# Patient Record
Sex: FEMALE | Race: OTHER
[De-identification: names, ages, dates, MRNs, and addresses within clinical notes are randomized per-mention and may not be internally consistent; named-entity substitution may affect disease eponyms.]

---

## 2017-05-17 ENCOUNTER — HOSPITAL ENCOUNTER (INPATIENT)
Dept: HOSPITAL 54 - TELE-TD | Age: 77
LOS: 2 days | Discharge: ANOTHER HOSPITAL | DRG: 205 | End: 2017-05-19
Attending: INTERNAL MEDICINE | Admitting: INTERNAL MEDICINE
Payer: MEDICARE

## 2017-05-17 VITALS — DIASTOLIC BLOOD PRESSURE: 106 MMHG | SYSTOLIC BLOOD PRESSURE: 145 MMHG

## 2017-05-17 VITALS — BODY MASS INDEX: 20.84 KG/M2 | WEIGHT: 125.06 LBS | HEIGHT: 65 IN

## 2017-05-17 DIAGNOSIS — I11.0: ICD-10-CM

## 2017-05-17 DIAGNOSIS — E11.9: ICD-10-CM

## 2017-05-17 DIAGNOSIS — I25.10: ICD-10-CM

## 2017-05-17 DIAGNOSIS — I25.2: ICD-10-CM

## 2017-05-17 DIAGNOSIS — I34.0: ICD-10-CM

## 2017-05-17 DIAGNOSIS — E87.6: ICD-10-CM

## 2017-05-17 DIAGNOSIS — E78.5: ICD-10-CM

## 2017-05-17 DIAGNOSIS — M94.0: Primary | ICD-10-CM

## 2017-05-17 DIAGNOSIS — Z98.61: ICD-10-CM

## 2017-05-17 DIAGNOSIS — I25.5: ICD-10-CM

## 2017-05-17 DIAGNOSIS — I50.23: ICD-10-CM

## 2017-05-17 RX ADMIN — ASPIRIN 81 MG SCH MG: 81 TABLET ORAL at 23:02

## 2017-05-17 RX ADMIN — ATORVASTATIN CALCIUM SCH MG: 40 TABLET, FILM COATED ORAL at 23:03

## 2017-05-17 NOTE — NUR
received pt from [facility] direct admit,  a/o x4, SB(48-50) MD aware, s/p stent placement on 
5/5, last troponin negative,  EF 50%, no chest pain verbalized, on 2l 02, lungs clear, no 
edema, v/s stable, no pain, pt turns and repositions by herself, daughter at the  bedside.

## 2017-05-18 VITALS — DIASTOLIC BLOOD PRESSURE: 48 MMHG | SYSTOLIC BLOOD PRESSURE: 98 MMHG

## 2017-05-18 VITALS — DIASTOLIC BLOOD PRESSURE: 61 MMHG | SYSTOLIC BLOOD PRESSURE: 95 MMHG

## 2017-05-18 VITALS — DIASTOLIC BLOOD PRESSURE: 48 MMHG | SYSTOLIC BLOOD PRESSURE: 97 MMHG

## 2017-05-18 VITALS — DIASTOLIC BLOOD PRESSURE: 60 MMHG | SYSTOLIC BLOOD PRESSURE: 126 MMHG

## 2017-05-18 VITALS — DIASTOLIC BLOOD PRESSURE: 39 MMHG | SYSTOLIC BLOOD PRESSURE: 119 MMHG

## 2017-05-18 VITALS — SYSTOLIC BLOOD PRESSURE: 125 MMHG | DIASTOLIC BLOOD PRESSURE: 51 MMHG

## 2017-05-18 LAB
BASOPHILS # BLD AUTO: 0 /CMM (ref 0–0.2)
BASOPHILS NFR BLD AUTO: 0.5 % (ref 0–2)
BUN SERPL-MCNC: 9 MG/DL (ref 7–18)
CALCIUM SERPL-MCNC: 9.1 MG/DL (ref 8.5–10.1)
CHLORIDE SERPL-SCNC: 106 MMOL/L (ref 98–107)
CO2 SERPL-SCNC: 26 MMOL/L (ref 21–32)
CREAT SERPL-MCNC: 0.7 MG/DL (ref 0.6–1.3)
EOSINOPHIL # BLD AUTO: 0.1 /CMM (ref 0–0.7)
EOSINOPHIL NFR BLD AUTO: 2 % (ref 0–6)
GLUCOSE SERPL-MCNC: 91 MG/DL (ref 74–106)
HCT VFR BLD AUTO: 39 % (ref 33–45)
HGB BLD-MCNC: 13.1 G/DL (ref 11.5–14.8)
LYMPHOCYTES NFR BLD AUTO: 1.5 /CMM (ref 0.8–4.8)
LYMPHOCYTES NFR BLD AUTO: 22.6 % (ref 20–44)
MCH RBC QN AUTO: 30 PG (ref 26–33)
MCHC RBC AUTO-ENTMCNC: 34 G/DL (ref 31–36)
MCV RBC AUTO: 88 FL (ref 82–100)
MONOCYTES NFR BLD AUTO: 0.6 /CMM (ref 0.1–1.3)
MONOCYTES NFR BLD AUTO: 9 % (ref 2–12)
NEUTROPHILS # BLD AUTO: 4.5 /CMM (ref 1.8–8.9)
NEUTROPHILS NFR BLD AUTO: 65.9 % (ref 43–81)
NT-PROBNP SERPL-MCNC: 2178 PG/ML (ref 0–125)
PLATELET # BLD AUTO: 211 /CMM (ref 150–450)
POTASSIUM SERPL-SCNC: 3.4 MMOL/L (ref 3.5–5.1)
RBC # BLD AUTO: 4.46 MIL/UL (ref 4–5.2)
RDW COEFFICIENT OF VARIATION: 12.9 (ref 11.5–15)
SODIUM SERPL-SCNC: 142 MMOL/L (ref 136–145)
TROPONIN I SERPL-MCNC: < 0.017 NG/ML (ref 0–0.06)
WBC NRBC COR # BLD AUTO: 6.9 K/UL (ref 4.3–11)

## 2017-05-18 RX ADMIN — Medication SCH EA: at 09:43

## 2017-05-18 RX ADMIN — LISINOPRIL SCH MG: 10 TABLET ORAL at 08:56

## 2017-05-18 RX ADMIN — CARVEDILOL SCH MG: 6.25 TABLET, FILM COATED ORAL at 20:53

## 2017-05-18 RX ADMIN — CARVEDILOL SCH MG: 6.25 TABLET, FILM COATED ORAL at 09:44

## 2017-05-18 RX ADMIN — Medication SCH EA: at 18:14

## 2017-05-18 RX ADMIN — LEVOTHYROXINE SODIUM SCH MCG: 75 TABLET ORAL at 07:47

## 2017-05-18 RX ADMIN — Medication SCH MG: at 09:43

## 2017-05-18 RX ADMIN — ASPIRIN 81 MG SCH MG: 81 TABLET ORAL at 08:53

## 2017-05-18 RX ADMIN — ATORVASTATIN CALCIUM SCH MG: 40 TABLET, FILM COATED ORAL at 20:54

## 2017-05-18 NOTE — NUR
ICU/RN: INITIAL NOTES,AM



RECEIVED REPORT FROM NIGHT NURSE. PT ALERT, AWAKE, ORIENTED X4. ON NASAL CANULA, NO ACUTE 
DISTRESS NOTED AT THIS TIME. PT DIRECT ADMIT FROM [place], NO CHEST PAIN NOTED AT THIS TIME. 
PT SINUS DURGA ON TELE. DAUGHTER AT BEDSIDE. PT ABLE TO AMBULATE, BRP. ALL NEEDS WILL BE 
MET, SAFETY MEASURES TAKEN, BED IN LOW POSITION, SIDE RAILS UP, CALL LIGHT WITHIN REACH. 
WILL CONTINUE CARE.

## 2017-05-18 NOTE — NUR
ICU/RN:  AT BEDSIDE. PT ASSESSED, MEDICATIONS REVIEWED. DOCTOR ANSWERED ALL 
QUESTIONS FOR PT AND DAUGHTER AND INTERVENTIONS DISCUSSED. WILL CONTINUE TO EDUCATE FAMILY 
AND ADDRESS ALL NEEDS

## 2017-05-18 NOTE — NUR
MARCIA/RN ENDING NOTES,AM



REPORT WILL BE ENDORSED TO NIGHT NURSE FOR CONTINUATION OF CARE. PT ON NASAL CANULA, NO 
ACUTE DISTRESS NOTED. PT HAS NO CHEST PAIN AT THIS TIME. FAMILY AT BEDSIDE. ALL NEEDS MET, 
SAFETY MEASURES TAKEN, BED IN LOW POSITION, SIDE RAILS UP, CALL LIGHT WITHIN REACH.

## 2017-05-19 ENCOUNTER — HOSPITAL ENCOUNTER (INPATIENT)
Dept: HOSPITAL 10 - CCL | Age: 77
LOS: 1 days | Discharge: HOME | DRG: 247 | End: 2017-05-20
Attending: INTERNAL MEDICINE | Admitting: INTERNAL MEDICINE
Payer: MEDICARE

## 2017-05-19 VITALS — RESPIRATION RATE: 15 BRPM | HEART RATE: 80 BPM | SYSTOLIC BLOOD PRESSURE: 106 MMHG | DIASTOLIC BLOOD PRESSURE: 53 MMHG

## 2017-05-19 VITALS — HEART RATE: 72 BPM | DIASTOLIC BLOOD PRESSURE: 50 MMHG | SYSTOLIC BLOOD PRESSURE: 105 MMHG | RESPIRATION RATE: 19 BRPM

## 2017-05-19 VITALS — DIASTOLIC BLOOD PRESSURE: 61 MMHG | HEART RATE: 73 BPM | RESPIRATION RATE: 14 BRPM | SYSTOLIC BLOOD PRESSURE: 140 MMHG

## 2017-05-19 VITALS — RESPIRATION RATE: 14 BRPM | DIASTOLIC BLOOD PRESSURE: 66 MMHG | HEART RATE: 62 BPM | SYSTOLIC BLOOD PRESSURE: 116 MMHG

## 2017-05-19 VITALS — SYSTOLIC BLOOD PRESSURE: 135 MMHG | DIASTOLIC BLOOD PRESSURE: 61 MMHG | HEART RATE: 66 BPM | RESPIRATION RATE: 18 BRPM

## 2017-05-19 VITALS — HEART RATE: 82 BPM | RESPIRATION RATE: 14 BRPM | SYSTOLIC BLOOD PRESSURE: 91 MMHG | DIASTOLIC BLOOD PRESSURE: 59 MMHG

## 2017-05-19 VITALS — DIASTOLIC BLOOD PRESSURE: 55 MMHG | SYSTOLIC BLOOD PRESSURE: 104 MMHG

## 2017-05-19 VITALS — RESPIRATION RATE: 18 BRPM | HEART RATE: 70 BPM | DIASTOLIC BLOOD PRESSURE: 49 MMHG | SYSTOLIC BLOOD PRESSURE: 93 MMHG

## 2017-05-19 VITALS — SYSTOLIC BLOOD PRESSURE: 86 MMHG | HEART RATE: 75 BPM | DIASTOLIC BLOOD PRESSURE: 45 MMHG | RESPIRATION RATE: 20 BRPM

## 2017-05-19 VITALS — RESPIRATION RATE: 20 BRPM | DIASTOLIC BLOOD PRESSURE: 58 MMHG | SYSTOLIC BLOOD PRESSURE: 116 MMHG | HEART RATE: 67 BPM

## 2017-05-19 VITALS — SYSTOLIC BLOOD PRESSURE: 125 MMHG | RESPIRATION RATE: 20 BRPM | DIASTOLIC BLOOD PRESSURE: 56 MMHG | HEART RATE: 65 BPM

## 2017-05-19 VITALS — RESPIRATION RATE: 15 BRPM | DIASTOLIC BLOOD PRESSURE: 63 MMHG | SYSTOLIC BLOOD PRESSURE: 117 MMHG | HEART RATE: 64 BPM

## 2017-05-19 VITALS — RESPIRATION RATE: 22 BRPM | SYSTOLIC BLOOD PRESSURE: 116 MMHG | DIASTOLIC BLOOD PRESSURE: 60 MMHG | HEART RATE: 63 BPM

## 2017-05-19 VITALS — DIASTOLIC BLOOD PRESSURE: 56 MMHG | RESPIRATION RATE: 15 BRPM | SYSTOLIC BLOOD PRESSURE: 126 MMHG | HEART RATE: 69 BPM

## 2017-05-19 VITALS — RESPIRATION RATE: 16 BRPM | HEART RATE: 79 BPM | DIASTOLIC BLOOD PRESSURE: 71 MMHG | SYSTOLIC BLOOD PRESSURE: 95 MMHG

## 2017-05-19 VITALS — SYSTOLIC BLOOD PRESSURE: 99 MMHG | HEART RATE: 73 BPM | RESPIRATION RATE: 21 BRPM | DIASTOLIC BLOOD PRESSURE: 50 MMHG

## 2017-05-19 VITALS — SYSTOLIC BLOOD PRESSURE: 90 MMHG | DIASTOLIC BLOOD PRESSURE: 44 MMHG

## 2017-05-19 VITALS — DIASTOLIC BLOOD PRESSURE: 56 MMHG | RESPIRATION RATE: 19 BRPM | HEART RATE: 66 BPM | SYSTOLIC BLOOD PRESSURE: 123 MMHG

## 2017-05-19 VITALS — SYSTOLIC BLOOD PRESSURE: 105 MMHG | RESPIRATION RATE: 18 BRPM | DIASTOLIC BLOOD PRESSURE: 52 MMHG | HEART RATE: 70 BPM

## 2017-05-19 VITALS — SYSTOLIC BLOOD PRESSURE: 104 MMHG | DIASTOLIC BLOOD PRESSURE: 55 MMHG

## 2017-05-19 VITALS — HEART RATE: 76 BPM | DIASTOLIC BLOOD PRESSURE: 58 MMHG | RESPIRATION RATE: 17 BRPM | SYSTOLIC BLOOD PRESSURE: 92 MMHG

## 2017-05-19 VITALS — HEART RATE: 73 BPM | SYSTOLIC BLOOD PRESSURE: 100 MMHG | DIASTOLIC BLOOD PRESSURE: 38 MMHG | RESPIRATION RATE: 25 BRPM

## 2017-05-19 VITALS
HEIGHT: 66 IN | BODY MASS INDEX: 20.9 KG/M2 | WEIGHT: 130.07 LBS | BODY MASS INDEX: 20.9 KG/M2 | HEIGHT: 66 IN | WEIGHT: 130.07 LBS

## 2017-05-19 VITALS — SYSTOLIC BLOOD PRESSURE: 143 MMHG | RESPIRATION RATE: 19 BRPM | DIASTOLIC BLOOD PRESSURE: 68 MMHG | HEART RATE: 56 BPM

## 2017-05-19 VITALS — SYSTOLIC BLOOD PRESSURE: 95 MMHG | DIASTOLIC BLOOD PRESSURE: 47 MMHG

## 2017-05-19 VITALS — HEART RATE: 75 BPM | SYSTOLIC BLOOD PRESSURE: 92 MMHG | DIASTOLIC BLOOD PRESSURE: 49 MMHG | RESPIRATION RATE: 17 BRPM

## 2017-05-19 DIAGNOSIS — I42.9: ICD-10-CM

## 2017-05-19 DIAGNOSIS — Z79.4: ICD-10-CM

## 2017-05-19 DIAGNOSIS — E03.9: ICD-10-CM

## 2017-05-19 DIAGNOSIS — Z79.02: ICD-10-CM

## 2017-05-19 DIAGNOSIS — I10: ICD-10-CM

## 2017-05-19 DIAGNOSIS — I25.110: ICD-10-CM

## 2017-05-19 DIAGNOSIS — I21.4: Primary | ICD-10-CM

## 2017-05-19 DIAGNOSIS — E78.5: ICD-10-CM

## 2017-05-19 DIAGNOSIS — Z79.82: ICD-10-CM

## 2017-05-19 DIAGNOSIS — E11.9: ICD-10-CM

## 2017-05-19 LAB
APTT PPP: 32 SEC (ref 23–34)
BASOPHILS # BLD AUTO: 0 /CMM (ref 0–0.2)
BASOPHILS NFR BLD AUTO: 0.3 % (ref 0–2)
BUN SERPL-MCNC: 13 MG/DL (ref 7–18)
CALCIUM SERPL-MCNC: 8.8 MG/DL (ref 8.5–10.1)
CHLORIDE SERPL-SCNC: 105 MMOL/L (ref 98–107)
CO2 SERPL-SCNC: 25 MMOL/L (ref 21–32)
CREAT SERPL-MCNC: 0.8 MG/DL (ref 0.6–1.3)
EOSINOPHIL # BLD AUTO: 0.1 /CMM (ref 0–0.7)
EOSINOPHIL NFR BLD AUTO: 1.5 % (ref 0–6)
GLUCOSE SERPL-MCNC: 111 MG/DL (ref 74–106)
HCT VFR BLD AUTO: 37 % (ref 33–45)
HGB BLD-MCNC: 12.5 G/DL (ref 11.5–14.8)
INR PPP: 1.06 (ref 0.87–1.13)
LYMPHOCYTES NFR BLD AUTO: 1.2 /CMM (ref 0.8–4.8)
LYMPHOCYTES NFR BLD AUTO: 13 % (ref 20–44)
MAGNESIUM SERPL-MCNC: 2 MG/DL (ref 1.8–2.4)
MCH RBC QN AUTO: 30 PG (ref 26–33)
MCHC RBC AUTO-ENTMCNC: 34 G/DL (ref 31–36)
MCV RBC AUTO: 88 FL (ref 82–100)
MONOCYTES NFR BLD AUTO: 0.7 /CMM (ref 0.1–1.3)
MONOCYTES NFR BLD AUTO: 7.6 % (ref 2–12)
NEUTROPHILS # BLD AUTO: 7.4 /CMM (ref 1.8–8.9)
NEUTROPHILS NFR BLD AUTO: 77.6 % (ref 43–81)
PHOSPHATE SERPL-MCNC: 4.2 MG/DL (ref 2.5–4.9)
PLATELET # BLD AUTO: 234 /CMM (ref 150–450)
POTASSIUM SERPL-SCNC: 3.9 MMOL/L (ref 3.5–5.1)
PROTHROMBIN TIME: 11.4 SECS (ref 9.5–12.7)
RBC # BLD AUTO: 4.17 MIL/UL (ref 4–5.2)
RDW COEFFICIENT OF VARIATION: 13.1 (ref 11.5–15)
SODIUM SERPL-SCNC: 139 MMOL/L (ref 136–145)
WBC NRBC COR # BLD AUTO: 9.5 K/UL (ref 4.3–11)

## 2017-05-19 PROCEDURE — C9600 PERC DRUG-EL COR STENT SING: HCPCS

## 2017-05-19 PROCEDURE — 027034Z DILATION OF CORONARY ARTERY, ONE ARTERY WITH DRUG-ELUTING INTRALUMINAL DEVICE, PERCUTANEOUS APPROACH: ICD-10-PCS | Performed by: INTERNAL MEDICINE

## 2017-05-19 PROCEDURE — C1887 CATHETER, GUIDING: HCPCS

## 2017-05-19 PROCEDURE — 4A023N7 MEASUREMENT OF CARDIAC SAMPLING AND PRESSURE, LEFT HEART, PERCUTANEOUS APPROACH: ICD-10-PCS | Performed by: INTERNAL MEDICINE

## 2017-05-19 PROCEDURE — C1894 INTRO/SHEATH, NON-LASER: HCPCS

## 2017-05-19 PROCEDURE — 80048 BASIC METABOLIC PNL TOTAL CA: CPT

## 2017-05-19 PROCEDURE — 87081 CULTURE SCREEN ONLY: CPT

## 2017-05-19 PROCEDURE — C1874 STENT, COATED/COV W/DEL SYS: HCPCS

## 2017-05-19 PROCEDURE — 85025 COMPLETE CBC W/AUTO DIFF WBC: CPT

## 2017-05-19 PROCEDURE — 83735 ASSAY OF MAGNESIUM: CPT

## 2017-05-19 PROCEDURE — C1725 CATH, TRANSLUMIN NON-LASER: HCPCS

## 2017-05-19 PROCEDURE — C1769 GUIDE WIRE: HCPCS

## 2017-05-19 PROCEDURE — 93458 L HRT ARTERY/VENTRICLE ANGIO: CPT

## 2017-05-19 PROCEDURE — 84100 ASSAY OF PHOSPHORUS: CPT

## 2017-05-19 PROCEDURE — B211YZZ FLUOROSCOPY OF MULTIPLE CORONARY ARTERIES USING OTHER CONTRAST: ICD-10-PCS | Performed by: INTERNAL MEDICINE

## 2017-05-19 RX ADMIN — Medication SCH EA: at 08:08

## 2017-05-19 RX ADMIN — LEVOTHYROXINE SODIUM SCH MCG: 75 TABLET ORAL at 08:08

## 2017-05-19 RX ADMIN — CARVEDILOL SCH MG: 6.25 TABLET, FILM COATED ORAL at 08:10

## 2017-05-19 RX ADMIN — Medication SCH MG: at 08:11

## 2017-05-19 RX ADMIN — ASPIRIN 81 MG SCH MG: 81 TABLET ORAL at 15:39

## 2017-05-19 RX ADMIN — ASPIRIN 81 MG SCH MG: 81 TABLET ORAL at 08:08

## 2017-05-19 RX ADMIN — LISINOPRIL SCH MG: 5 TABLET ORAL at 15:39

## 2017-05-19 RX ADMIN — LISINOPRIL SCH MG: 10 TABLET ORAL at 08:12

## 2017-05-19 RX ADMIN — ISOSORBIDE MONONITRATE SCH MG: 30 TABLET, EXTENDED RELEASE ORAL at 15:39

## 2017-05-19 NOTE — OPR
Date/Time of Note


Date/Time of Note


DATE: 5/19/17 


TIME: 13:25





Operative Report


Free Text/Dictation


Procedure Date: 5/19/2017





Procedures Performed:


1)Left heart catheterization with selective left and right coronary angiography.


2)Balloon angioplasty and stenting of the ostial RCA with a Synergy 2.25 x 20 

drug-eluting stent.





Pre-operative Diagnosis: Chest pain with ACS, known residual ostial RCA lesion 

noted during PCI for recent anterior MI





Post-operative Diagnosis:same. s/p ostial RCA PCI





Indications:77 yo F with a h/o CAD with recent anterior STEMI s/p PCI with 

stents to prox and distal LAD and known residual ostial RCA disease who 

presented with recurrent chest pain.  Initial trop at [facility] was 0.9, 

repeat at [place] was normal.  As pt had recurrent symptoms and known 

residual stenosis which was to be revascularized, the plan was for angiography 

to confirm and possible PCI.








Description of Procedure:


After informed consent, the patient was brought to the cardiac catheterization 

lab.  The procedure site was prepped and draped in usual manner. The patient 

was premedicated with versed 0.5 mg . 5 mL lidocaine was injected into the 

right groin.  Next using a micropuncture needle and the Seldinger technique, 

the 6 Guamanian sheath was inserted into the right femoral artery. Next using the 

JL4 and JR4, selective angiography of the left and right coronary arteries were 

obtained. LV pressures were obtained with the JR.  Left ventricle angiography 

was not obtained. 





The decision was made to proceed with PCI of the ostial RCA as this was 

angiographically stenosed and the pt had recurrent angina. The remainder of the 

vessel had diffuse plaquing and the decision was made to only stent the ostial 

RCA.  A 6 Guamanian JR 3.5 with side holes guide was advanced and engaged into the 

right coronary artery. After appropriate anticoagulation and antiplatelets were 

given, the BMW angioplasty wire was advanced past the lesion. Next the 2.0 X 12 

balloon was used to dilate the lesion times 3 at a maximum of 12 reza.  

Subsequently, the Synergy 2.25 x 20 stent was advanced to the lesion and 

deployed at 11 reza. Next the stent was post dilated with the 2.5 X 12 

noncompliant balloon times 3 at a maximum of 12 reza including fairing of the 

ostium. Final angiography revealed AVA 3 flow, no edge dissection, and 

appropriate stent expansion. 








Next all equipment was removed and hemostasis was achieved by manual 

compression which will be done in the ICU.





Findings: 





Anatomy/Hemodynamics:


Left main: normal


LAD:prox and distal stents patent


Diagonal:small diffusely disease vessel


Circumflex:no significant disease


Obtuse marginal:no significant disease


RCA: dominant but small vessel with ostial 70-80%, and diffuse plaquing 

including up to 50% in the mid and distal vessel


PDA: mild plaquing 


PLV:mild plaquing 





LV angiography:not done





LV-Ao pullback: no gradient


LVEDP: 5 mmHg








Contrast used: 190mL





Medications used:


Versed 0.5


Ticagrelor 90mg (pt received 90mg earlier in day and has been on it at home)


ASA already given prior


angiomax bolus plus drip





Equipment used:


6 Guamanian JR 3.5 with side holes guide


BMW angioplasty wire


2 x 12 balloon 


Synergy 2.25 x 20 drug eluting stent


2.5 x 12 noncompliant balloon





Assessment:


ACS/chest pain s/p PCI of ostial RCA 


h/o anterior STEMI (patent stents)


Ischemic cardiomyopathy: EF 40%





Plan:


-ASA 81mg


-ticagrelor 90mg BID


-lipitor


-coreg and lisinopril as BP and HR tolerate


-imdur 30mg for small vessel disease 


-monitor o/n. Possible d/c tomorrow











JOSE PICHARDO May 19, 2017 13:38

## 2017-05-19 NOTE — NUR
RN INITIAL NOTE

RECEIVED PT FROM DARYN ICU RN. PT A/OX 4 Kiswahili SPEAKING DAUGHTER @ BEDSIDE. PT NC 2L NO 
ACUTE SOB. TELE SB WITH INVERTED T-WAVE. IV RAC #20G . AWAITING TRANSFER TO [facility] TO CATH LAB. 
REPORT GIVEN TO THOM STEARNS.

## 2017-05-19 NOTE — NUR
RN NOTE

PT TRANSFERED TO [facility] FOR CATH. PT STABLE TAKE VIA AMBULANCE REPORT GIVEN TO CHELSIE RUVALCABA. 

B/P TAKEN 128/69. IV FLUSHED PATENT AND INTACT. BELONGING LIST SIGNED BY DAUGHTER WHO IS @ 
BEDSIDE. BELONGINGS LIST AND MEDICATION  (BRILINTA) RECEIVED FROM PHARMACY AND GIVEN TO 
DAUGHTER. DC TRANSFER PAPER WORK AND LAB RESULTS GIVEN TO EMT.

## 2017-05-19 NOTE — NUR
ICU RN - PT. IS RESTING W/EYES CLOSED. AT START OF SHIFT, FAMILY IN RM. TO TALK W/DR. LAND. MD WAS POLITE ENOUGH TO CALL BACK & TALK W/ FAMILY MEMBERS VIA PHONE. MD TALKED 
W/ME RE: CARDIAC CATH TO BE DONE AT [facility] TOMORROW AT NOON.  DARYN, TALKED W/CHARGE 
JUNE KHAN. CHART COPIED. PT. REC'D ONE ULTRAM 50MG/PO AT START OF SHIFT LAST NIGHT. PT. 
CLAIMS THAT HER BLE'S "SPASM". PT.HAD LEFT ENDARTECTOMY DONE W/STENTS AT [facility] - 10 DAYS AGO. 
HEART MONITOR SHOWED SB IN THE 50'S AT [place]. PT. WAS SOUND ASLEEP.

AFEBRILE. PT.IS NPO NOW. ALL PULSES PALBABLE X 4 EXT. PT. IS ON O2/2L/NC. WILL CALL REPORT 
TO [facility] AT 6AM. CONT. POC.

## 2017-05-20 VITALS — SYSTOLIC BLOOD PRESSURE: 96 MMHG | HEART RATE: 66 BPM | RESPIRATION RATE: 16 BRPM | DIASTOLIC BLOOD PRESSURE: 53 MMHG

## 2017-05-20 VITALS — DIASTOLIC BLOOD PRESSURE: 57 MMHG | SYSTOLIC BLOOD PRESSURE: 122 MMHG | RESPIRATION RATE: 17 BRPM | HEART RATE: 60 BPM

## 2017-05-20 VITALS — DIASTOLIC BLOOD PRESSURE: 71 MMHG | HEART RATE: 73 BPM | RESPIRATION RATE: 19 BRPM | SYSTOLIC BLOOD PRESSURE: 108 MMHG

## 2017-05-20 VITALS — SYSTOLIC BLOOD PRESSURE: 94 MMHG | HEART RATE: 78 BPM | DIASTOLIC BLOOD PRESSURE: 55 MMHG | RESPIRATION RATE: 18 BRPM

## 2017-05-20 VITALS — SYSTOLIC BLOOD PRESSURE: 101 MMHG | DIASTOLIC BLOOD PRESSURE: 50 MMHG | HEART RATE: 64 BPM | RESPIRATION RATE: 16 BRPM

## 2017-05-20 VITALS — RESPIRATION RATE: 18 BRPM | SYSTOLIC BLOOD PRESSURE: 94 MMHG | HEART RATE: 64 BPM | DIASTOLIC BLOOD PRESSURE: 53 MMHG

## 2017-05-20 VITALS — SYSTOLIC BLOOD PRESSURE: 95 MMHG | DIASTOLIC BLOOD PRESSURE: 53 MMHG | HEART RATE: 66 BPM | RESPIRATION RATE: 17 BRPM

## 2017-05-20 VITALS — HEART RATE: 71 BPM

## 2017-05-20 VITALS — SYSTOLIC BLOOD PRESSURE: 109 MMHG | HEART RATE: 78 BPM | DIASTOLIC BLOOD PRESSURE: 65 MMHG | RESPIRATION RATE: 17 BRPM

## 2017-05-20 VITALS — DIASTOLIC BLOOD PRESSURE: 51 MMHG | SYSTOLIC BLOOD PRESSURE: 90 MMHG | HEART RATE: 63 BPM | RESPIRATION RATE: 18 BRPM

## 2017-05-20 VITALS — RESPIRATION RATE: 15 BRPM | SYSTOLIC BLOOD PRESSURE: 92 MMHG | HEART RATE: 76 BPM | DIASTOLIC BLOOD PRESSURE: 54 MMHG

## 2017-05-20 VITALS — SYSTOLIC BLOOD PRESSURE: 114 MMHG | DIASTOLIC BLOOD PRESSURE: 73 MMHG | RESPIRATION RATE: 18 BRPM | HEART RATE: 71 BPM

## 2017-05-20 VITALS — RESPIRATION RATE: 20 BRPM | SYSTOLIC BLOOD PRESSURE: 92 MMHG | DIASTOLIC BLOOD PRESSURE: 49 MMHG | HEART RATE: 65 BPM

## 2017-05-20 VITALS — RESPIRATION RATE: 19 BRPM | HEART RATE: 74 BPM | DIASTOLIC BLOOD PRESSURE: 54 MMHG | SYSTOLIC BLOOD PRESSURE: 111 MMHG

## 2017-05-20 VITALS — HEART RATE: 67 BPM

## 2017-05-20 LAB
ADD SCAN DIFF: NO
ANION GAP SERPL CALC-SCNC: 15 MMOL/L (ref 8–16)
BASOPHILS # BLD AUTO: 0 10^3/UL (ref 0–0.1)
BASOPHILS NFR BLD: 0.4 % (ref 0–2)
BUN SERPL-MCNC: 10 MG/DL (ref 7–20)
CALCIUM SERPL-MCNC: 9.2 MG/DL (ref 8.4–10.2)
CHLORIDE SERPL-SCNC: 101 MMOL/L (ref 97–110)
CO2 SERPL-SCNC: 27 MMOL/L (ref 21–31)
CREAT SERPL-MCNC: 0.69 MG/DL (ref 0.44–1)
EOSINOPHIL # BLD: 0.1 10^3/UL (ref 0–0.5)
EOSINOPHIL NFR BLD: 1.1 % (ref 0–7)
ERYTHROCYTE [DISTWIDTH] IN BLOOD BY AUTOMATED COUNT: 13 % (ref 11.5–14.5)
GLUCOSE SERPL-MCNC: 110 MG/DL (ref 70–220)
HCT VFR BLD CALC: 36.4 % (ref 37–47)
HGB BLD-MCNC: 12 G/DL (ref 12–16)
LYMPHOCYTES # BLD AUTO: 1.2 10^3/UL (ref 0.8–2.9)
LYMPHOCYTES NFR BLD AUTO: 14.7 % (ref 15–51)
MAGNESIUM SERPL-MCNC: 1.9 MG/DL (ref 1.7–2.5)
MCH RBC QN AUTO: 29.9 PG (ref 29–33)
MCHC RBC AUTO-ENTMCNC: 33 G/DL (ref 32–37)
MCV RBC AUTO: 90.8 FL (ref 82–101)
MONOCYTES # BLD: 0.7 10^3/UL (ref 0.3–0.9)
MONOCYTES NFR BLD: 8.1 % (ref 0–11)
NEUTROPHILS # BLD: 6.2 10^3/UL (ref 1.6–7.5)
NEUTROPHILS NFR BLD AUTO: 75 % (ref 39–77)
NRBC # BLD MANUAL: 0 10^3/UL (ref 0–0)
NRBC BLD QL: 0 /100WBC (ref 0–0)
PHOSPHATE SERPL-MCNC: 4.5 MG/DL (ref 2.5–4.9)
PLATELET # BLD: 223 10^3/UL (ref 140–415)
PMV BLD AUTO: 10.5 FL (ref 7.4–10.4)
POTASSIUM SERPL-SCNC: 3.8 MMOL/L (ref 3.5–5.1)
RBC # BLD AUTO: 4.01 10^6/UL (ref 4.2–5.4)
SODIUM SERPL-SCNC: 139 MMOL/L (ref 135–144)
WBC # BLD AUTO: 8.3 10^3/UL (ref 4.8–10.8)

## 2017-05-20 RX ADMIN — ISOSORBIDE MONONITRATE SCH MG: 30 TABLET, EXTENDED RELEASE ORAL at 09:00

## 2017-05-20 RX ADMIN — ASPIRIN 81 MG SCH MG: 81 TABLET ORAL at 08:42

## 2017-05-20 RX ADMIN — LISINOPRIL SCH MG: 5 TABLET ORAL at 09:00

## 2017-05-20 NOTE — HP
DATE OF ADMISSION: 05/19/2017

 

CHIEF COMPLAINT:  Chest pain, acute coronary syndrome.  

 

HISTORY OF PRESENT ILLNESS:  This is a 76-year-old female with a past medical history of hypertensio
n, coronary artery disease and dyslipidemia who initially presented to an outside hospital for evalu
ation of chest pain.  The patient's history begins approximately several weeks ago, when she initial
ly went under cardiac catheterization at [facility] and had 2 stents placed.  At that point
 the patient's stents were placed on the left side, per family; however, she was also noted to have 
right coronary artery stenosis which needed further intervention.  The patient afterwards was discha
rged home and on medical management.  Two or 3 days prior to this current admission the patient deve
loped chest pain and was initially brought to [facility].  She had an elevated troponin and was trans
ferred to [facility] for continued care.  While at [facility] the patient was 
being treated for a non-ST elevation myocardial infarction.  She was medically managed and transferr
ed to [facility] where she underwent cardiac catheterization with angioplasty, PCI
 and stenting of the RCA.  Postoperatively the patient was transferred to the intensive care unit fo
r observation.

 

Upon my evaluation of the patient at this time she is currently stable.  Denies any active chest anay
n, nausea, vomiting or shortness of breath.

 

PAST MEDICAL HISTORY:  History of coronary artery disease and hypertension.

 

PAST SURGICAL HISTORY:  Status post cardiac catheterization with PCI.

 

FAMILY HISTORY:  Noncontributory.

 

SOCIAL HISTORY:  Does not drink, smoke or do drugs.

 

MEDICATIONS:  The patient's medications have been reviewed and reconciled.

 

REVIEW OF SYSTEMS:  A 14-point review of systems was conducted.  Pertinent positives as stated in th
e HPI, otherwise negative.

 

PHYSICAL EXAMINATION:

VITAL SIGNS:  Blood pressure 140/61, respirations 40, pulse 73, temperature 98.6.

HEENT:  Head is normocephalic.

NECK:  Supple.

HEART:  Regular rate.

LUNGS:  Show diminished breath sounds at the base.

ABDOMEN:  Soft, nontender to palpation.  No rebound or guarding.

EXTREMITIES:  Negative for clubbing or cyanosis.  No edema.

DERMATOLOGIC:  No rashes.

MUSCULOSKELETAL:  Have no joint effusion.

NEUROLOGIC:  No focal deficits.

 

MEDICATIONS:  The patient's medications have been reviewed.

 

LABORATORY DATA: Labs from the outside hospital shows a troponin of 0.017, a sodium of 142, potassiu
m 3.4, BUN 9, creatinine 0.7.  White count 6.9, hemoglobin 13.1, hematocrit of 39, platelet count is
 211.

 

ASSESSMENT AND PLAN:  This is a 76-year-old female who presents with:

1.  Non-ST elevation myocardial infarction.  The patient is status post cardiac catheterization with
 a PCI to the RCA with a drug-eluting stent.  Plan at this point is to continue the current medical 
management with aspirin, Brilinta and Lipitor.  Will continue Coreg and lisinopril as blood pressure
 tolerates.  Will monitor in the intensive care unit.  Follow up with cardiology for further recomme
ndations.

2.  Hypertension.  Blood pressure is currently well controlled.  Continue the current blood pressure
 regimen as tolerated.  Adjust medications as needed.

3.  Dyslipidemia.  Continue statin therapy.

4.  History of coronary artery disease status post PCI, with 2 stents placed to the LAD.  Continue t
he current medical management.

5.  Hypothyroidism.  Continue Synthroid.

 

Please note, I spent 25 minutes in face-to-face time with the patient. The patient is FULL CODE.

 

DISPOSITION:  Anticipate possible discharge in the a.m.

 

 

Dictated By: KEI HILARIO/ANASTASIA

DD:    05/19/2017 18:17:02

DT:    05/20/2017 06:25:31

Conf#: [id number]

DID#:  [id number]

## 2017-05-20 NOTE — DS
Date/Time of Note


Date/Time of Note


DATE: 5/20/17 


TIME: 10:57





Discharge Summary


Admission/Discharge Info


Admit Date/Time


May 19, 2017 at 13:32


Discharge Date/Time





Final Diagnosis








1.  Non-ST elevation myocardial infarction.  The patient is status post cardiac 

catheterization with a PCI to the RCA with a drug-eluting stent.  Plan at this 

point is to continue the current medical management with aspirin, Brilinta and 

Lipitor.  Will continue Coreg and lisinopril as blood pressure tolerates.  Will 

monitor in the intensive care unit.  Follow up with cardiology for further 

recommendations.


2.  Hypertension.  Blood pressure is currently well controlled.  Continue the 

current blood pressure regimen as tolerated.  Adjust medications as needed.


3.  Dyslipidemia.  Continue statin therapy.


4.  History of coronary artery disease status post PCI, with 2 stents placed to 

the LAD.  Continue the current medical management.


5.  Hypothyroidism.  Continue Synthroid.


Patient Condition:  Fair


Hospital Course


This is a 76-year-old female with a past medical history of hypertension, 

coronary artery disease and dyslipidemia who initially presented to an outside 

hospital for evaluation of chest pain.  The patient's history begins 

approximately several weeks ago, when she initially went under cardiac 

catheterization at [facility] and had 2 stents placed.  At that point 

the patient's stents were placed on the left side, per family; however, she was 

also noted to have right coronary artery stenosis which needed further 

intervention.  The patient afterwards was discharged home and on medical 

management.  Two or 3 days prior to this current admission the patient 

developed chest pain and was initially brought to [facility].  She had an 

elevated troponin and was transferred to [facility] for continued 

care.  While at [facility] the patient was being treated for a non-

ST elevation myocardial infarction.  She was medically managed and transferred 

to [facility] where she underwent cardiac catheterization 

with angioplasty, PCI and stenting of the RCA.  Postoperatively the patient was 

transferred to the intensive care unit for observation.  uncomplicated course 

but noted relatively low bp.


 


Upon my evaluation of the patient at this time she is currently stable.  Denies 

any active chest pain, nausea, vomiting or shortness of breath.


 





REVIEW OF SYSTEMS:  A 14-point review of systems was conducted.  Pertinent 

positives as stated in the HPI, otherwise negative.


 


PHYSICAL EXAMINATION:





HEENT:  Head is normocephalic.


NECK:  Supple.


HEART:  Regular rate.


LUNGS:  Show diminished breath sounds at the base.


ABDOMEN:  Soft, nontender to palpation.  No rebound or guarding.


EXTREMITIES:  Negative for clubbing or cyanosis.  No edema.


DERMATOLOGIC:  No rashes.


MUSCULOSKELETAL:  Have no joint effusion.


NEUROLOGIC:  No focal deficits.


Home Meds


Reported Medications


Levothyroxine Sodium* (Levothyroxine Sodium*) 75 Mcg Tablet, 75 MCG PO BEFORE 

BREAKFAST, #30 TAB


   5/19/17


Lisinopril* (Lisinopril*) 10 Mg Tablet, 10 MG PO DAILY, #30 TAB


   5/19/17


Levothyroxine Sodium* (Levothyroxine Sodium*) 75 Mcg Tablet, 75 MCG PO BEFORE 

BREAKFAST, #30 TAB


   5/19/17


Isosorbide Mononitrate* (Isosorbide Mononitrate*) 30 Mg Tab.er.24h, 30 MG PO 

DAILY, TAB


   5/19/17


Ticagrelor (Brilinta) 60 Mg Tablet, 60 MG PO, TAB


   5/19/17


Carvedilol* (Coreg*) 3.125 Mg Tablet, 3.125 MG PO BID, #60 TAB


   5/19/17


Atorvastatin* (Atorvastatin*) 80 Mg Tablet, 80 MG PO QHS, #30 TAB


   5/19/17


Aspirin* (Aspirin* EC) 81 Mg Tablet., 81 MG PO DAILY, TAB


   5/19/17


Primary Care Provider


Not On Staff Doctor


Time spent on discharge:   > 30 minutes


Pending Labs





Laboratory Tests








Test


  5/20/17


05:05


 


White Blood Count


  8.310^3/ul


(4.8-10.8)


 


Red Blood Count


  4.0110^6/ul


(4.20-5.40)


 


Hemoglobin


  12.0g/dl


(12.0-16.0)


 


Hematocrit


  36.4%


(37.0-47.0)


 


Mean Corpuscular Volume


  90.8fl


(82.0-101.0)


 


Mean Corpuscular Hemoglobin


  29.9pg


(29.0-33.0)


 


Mean Corpuscular Hemoglobin


Concent 33.0g/dl


(32.0-37.0)


 


Red Cell Distribution Width


  13.0%


(11.5-14.5)


 


Platelet Count


  79716^3/UL


(140-415)


 


Mean Platelet Volume


  10.5fl


(7.4-10.4)


 


Neutrophils %


  75.0%


(39.0-77.0)


 


Lymphocytes %


  14.7%


(15.0-51.0)


 


Monocytes %


  8.1%


(0.0-11.0)


 


Eosinophils % 1.1% (0.0-7.0) 


 


Basophils % 0.4% (0.0-2.0) 


 


Nucleated Red Blood Cells %


  0.0/100WBC


(0.0-0.0)


 


Neutrophils #


  6.210^3/ul


(1.6-7.5)


 


Lymphocytes #


  1.210^3/ul


(0.8-2.9)


 


Monocytes #


  0.710^3/ul


(0.3-0.9)


 


Eosinophils #


  0.110^3/ul


(0.0-0.5)


 


Basophils #


  0.010^3/ul


(0.0-0.1)


 


Nucleated Red Blood Cells #


  0.010^3/ul


(0.0-0.0)


 


Sodium Level


  139mmol/L


(135-144)


 


Potassium Level


  3.8mmol/L


(3.5-5.1)


 


Chloride Level


  101mmol/L


()


 


Carbon Dioxide Level


  27mmol/L


(21-31)


 


Anion Gap 15 (8-16) 


 


Blood Urea Nitrogen 10mg/dl (7-20) 


 


Creatinine


  0.69mg/dl


(0.44-1.00)


 


Glucose Level


  110mg/dl


()


 


Calcium Level


  9.2mg/dl


(8.4-10.2)


 


Phosphorus Level


  4.5mg/dl


(2.5-4.9)


 


Magnesium Level


  1.9mg/dl


(1.7-2.5)








Microbiology








 Date/Time


Source Procedure


Growth Status


 


 


 5/19/17 14:05


Nares MRSA Screen - Preliminary


Screening in process Resulted

















JENNIFER CAI MD May 20, 2017 10:58

## 2018-12-19 ENCOUNTER — HOSPITAL ENCOUNTER (INPATIENT)
Dept: HOSPITAL 54 - TELE1 | Age: 78
LOS: 1 days | Discharge: HOME | DRG: 392 | End: 2018-12-20
Attending: INTERNAL MEDICINE | Admitting: INTERNAL MEDICINE
Payer: MEDICARE

## 2018-12-19 VITALS — BODY MASS INDEX: 19.33 KG/M2 | WEIGHT: 116 LBS | HEIGHT: 65 IN

## 2018-12-19 VITALS — DIASTOLIC BLOOD PRESSURE: 66 MMHG | SYSTOLIC BLOOD PRESSURE: 150 MMHG

## 2018-12-19 VITALS — DIASTOLIC BLOOD PRESSURE: 75 MMHG | SYSTOLIC BLOOD PRESSURE: 168 MMHG

## 2018-12-19 DIAGNOSIS — Z95.5: ICD-10-CM

## 2018-12-19 DIAGNOSIS — I25.5: ICD-10-CM

## 2018-12-19 DIAGNOSIS — E11.9: ICD-10-CM

## 2018-12-19 DIAGNOSIS — I25.10: ICD-10-CM

## 2018-12-19 DIAGNOSIS — K29.70: Primary | ICD-10-CM

## 2018-12-19 DIAGNOSIS — I10: ICD-10-CM

## 2018-12-19 DIAGNOSIS — E78.5: ICD-10-CM

## 2018-12-19 DIAGNOSIS — K21.0: ICD-10-CM

## 2018-12-19 PROCEDURE — A9502 TC99M TETROFOSMIN: HCPCS

## 2018-12-19 PROCEDURE — G0378 HOSPITAL OBSERVATION PER HR: HCPCS

## 2018-12-19 NOTE — NUR
MARCIA RN NOTES







RECEIVED PATIENT REPORT FROM AM NURSE. PT IS AOX3, Hungarian SPEAKER, DAUGHTER AT BEDSIDE, VS 
STABLE ,ON TELEMETRY MONITOR SB 49, IV IN PLACE AND INTACT. PATIENT IS A/A/OX4. COMPLAINS OF 
PAIN 2/10.NO SOB AT THIS TIME.NO COMPLAINS OF  N/V.  CALL LIGHT WITH IN REACH, BED IN LOCKED 
AND LOW POSITION.WILL CONT. TO MONITOR.

## 2018-12-19 NOTE — NUR
RN NOTE

RECEIVED PT FROM [place] VIA AMBULANCE, PT AOX3, Persian SPEAKER, DAUGHTER AT BEDSIDE, VS 
STABLE EXCEPT FOR BP ELEVATED 168/75, ON TELEMETRY MONITOR SB 49, IV IN PLACE AND INTACT. DR JENKINS CALLED FOR ADMIT ORDERS, AWAITING FOR ORDERS AND ENDORSE TO NIGHT SHIFT. CALL 
LIGHT WITH IN REACH, BED IN LOCKED AND LOW POSITION.

## 2018-12-20 VITALS — DIASTOLIC BLOOD PRESSURE: 59 MMHG | SYSTOLIC BLOOD PRESSURE: 118 MMHG

## 2018-12-20 VITALS — SYSTOLIC BLOOD PRESSURE: 157 MMHG | DIASTOLIC BLOOD PRESSURE: 66 MMHG

## 2018-12-20 VITALS — DIASTOLIC BLOOD PRESSURE: 55 MMHG | SYSTOLIC BLOOD PRESSURE: 130 MMHG

## 2018-12-20 VITALS — SYSTOLIC BLOOD PRESSURE: 130 MMHG | DIASTOLIC BLOOD PRESSURE: 55 MMHG

## 2018-12-20 VITALS — DIASTOLIC BLOOD PRESSURE: 64 MMHG | SYSTOLIC BLOOD PRESSURE: 162 MMHG

## 2018-12-20 VITALS — SYSTOLIC BLOOD PRESSURE: 118 MMHG | DIASTOLIC BLOOD PRESSURE: 59 MMHG

## 2018-12-20 LAB
BASOPHILS # BLD AUTO: 0 /CMM (ref 0–0.2)
BASOPHILS NFR BLD AUTO: 0.4 % (ref 0–2)
BUN SERPL-MCNC: 12 MG/DL (ref 7–18)
CALCIUM SERPL-MCNC: 9 MG/DL (ref 8.5–10.1)
CHLORIDE SERPL-SCNC: 107 MMOL/L (ref 98–107)
CHOLEST SERPL-MCNC: 99 MG/DL (ref ?–200)
CO2 SERPL-SCNC: 28 MMOL/L (ref 21–32)
CREAT SERPL-MCNC: 0.6 MG/DL (ref 0.6–1.3)
EOSINOPHIL NFR BLD AUTO: 2.4 % (ref 0–6)
GLUCOSE SERPL-MCNC: 97 MG/DL (ref 74–106)
HCT VFR BLD AUTO: 36 % (ref 33–45)
HDLC SERPL-MCNC: 43 MG/DL (ref 40–60)
HGB BLD-MCNC: 12.1 G/DL (ref 11.5–14.8)
LDLC SERPL DIRECT ASSAY-MCNC: 50 MG/DL (ref 0–99)
LYMPHOCYTES NFR BLD AUTO: 1.8 /CMM (ref 0.8–4.8)
LYMPHOCYTES NFR BLD AUTO: 35 % (ref 20–44)
MAGNESIUM SERPL-MCNC: 1.9 MG/DL (ref 1.8–2.4)
MCHC RBC AUTO-ENTMCNC: 34 G/DL (ref 31–36)
MCV RBC AUTO: 90 FL (ref 82–100)
MONOCYTES NFR BLD AUTO: 0.5 /CMM (ref 0.1–1.3)
MONOCYTES NFR BLD AUTO: 9.6 % (ref 2–12)
NEUTROPHILS # BLD AUTO: 2.7 /CMM (ref 1.8–8.9)
NEUTROPHILS NFR BLD AUTO: 52.6 % (ref 43–81)
PHOSPHATE SERPL-MCNC: 3.7 MG/DL (ref 2.5–4.9)
PLATELET # BLD AUTO: 154 /CMM (ref 150–450)
POTASSIUM SERPL-SCNC: 3.4 MMOL/L (ref 3.5–5.1)
RBC # BLD AUTO: 3.97 MIL/UL (ref 4–5.2)
SODIUM SERPL-SCNC: 144 MMOL/L (ref 136–145)
TRIGL SERPL-MCNC: 71 MG/DL (ref 30–150)
WBC NRBC COR # BLD AUTO: 5.2 K/UL (ref 4.3–11)

## 2018-12-20 NOTE — NUR
TELE RN SHALOM SAWYER

RECEIVED PT IS AOX3, Maori SPEAKING, DAUGHTER AT BEDSIDE, VS STABLE ,ON TELEMETRY MONITOR 
SB 50'S, IV IN PLACE AND INTACT. PATIENT IS A/A/OX4.DENIES PAIN.NO SOB AT THIS TIME.NO 
COMPLAINS OF  N/V.  CALL LIGHT WITH IN REACH, BED IN LOCKED AND LOW POSITION.WILL CONT. TO 
MONITOR.

## 2018-12-20 NOTE — NUR
DISCHARGED HOME VIA PRIVATE CAR WITH STABLE V/S.DENIES ANY CHEST PAIN AND IS VERY EAGER TO 
GO HOME.IV H/L TO LT AC REMOVED WITHOUT BLEEDING NOTED.PT TOLERATED WELL.ACCOMPANIED BY HER 
DAUGHTER AND BROTHER IN LAW.

## 2023-03-28 ENCOUNTER — HOSPITAL ENCOUNTER (EMERGENCY)
Dept: HOSPITAL 54 - ER | Age: 83
Discharge: HOME | End: 2023-03-28
Payer: MEDICARE

## 2023-03-28 VITALS — BODY MASS INDEX: 21.35 KG/M2 | WEIGHT: 116 LBS | HEIGHT: 62 IN

## 2023-03-28 VITALS — SYSTOLIC BLOOD PRESSURE: 122 MMHG | DIASTOLIC BLOOD PRESSURE: 76 MMHG

## 2023-03-28 DIAGNOSIS — Z79.899: ICD-10-CM

## 2023-03-28 DIAGNOSIS — Z95.5: ICD-10-CM

## 2023-03-28 DIAGNOSIS — I10: ICD-10-CM

## 2023-03-28 DIAGNOSIS — Z20.822: ICD-10-CM

## 2023-03-28 DIAGNOSIS — E78.5: ICD-10-CM

## 2023-03-28 DIAGNOSIS — R10.13: Primary | ICD-10-CM

## 2023-03-28 LAB
ALBUMIN SERPL BCP-MCNC: 3.8 G/DL (ref 3.4–5)
ALP SERPL-CCNC: 85 U/L (ref 46–116)
ALT SERPL W P-5'-P-CCNC: 26 U/L (ref 12–78)
AST SERPL W P-5'-P-CCNC: 27 U/L (ref 15–37)
BASOPHILS # BLD AUTO: 0 K/UL (ref 0–0.2)
BASOPHILS NFR BLD AUTO: 0.6 % (ref 0–2)
BILIRUB DIRECT SERPL-MCNC: 0.1 MG/DL (ref 0–0.2)
BILIRUB SERPL-MCNC: 0.6 MG/DL (ref 0.2–1)
BUN SERPL-MCNC: 10 MG/DL (ref 7–18)
CALCIUM SERPL-MCNC: 9.5 MG/DL (ref 8.5–10.1)
CHLORIDE SERPL-SCNC: 106 MMOL/L (ref 98–107)
CO2 SERPL-SCNC: 31 MMOL/L (ref 21–32)
CREAT SERPL-MCNC: 0.9 MG/DL (ref 0.6–1.3)
EOSINOPHIL NFR BLD AUTO: 2.3 % (ref 0–6)
GLUCOSE SERPL-MCNC: 107 MG/DL (ref 74–106)
HCT VFR BLD AUTO: 40 % (ref 33–45)
HGB BLD-MCNC: 13 G/DL (ref 11.5–14.8)
LIPASE SERPL-CCNC: 119 U/L (ref 73–393)
LYMPHOCYTES NFR BLD AUTO: 1.8 K/UL (ref 0.8–4.8)
LYMPHOCYTES NFR BLD AUTO: 31.4 % (ref 20–44)
MCHC RBC AUTO-ENTMCNC: 33 G/DL (ref 31–36)
MCV RBC AUTO: 94 FL (ref 82–100)
MONOCYTES NFR BLD AUTO: 0.4 K/UL (ref 0.1–1.3)
MONOCYTES NFR BLD AUTO: 7.1 % (ref 2–12)
NEUTROPHILS # BLD AUTO: 3.4 K/UL (ref 1.8–8.9)
NEUTROPHILS NFR BLD AUTO: 58.6 % (ref 43–81)
PLATELET # BLD AUTO: 209 K/UL (ref 150–450)
POTASSIUM SERPL-SCNC: 3.5 MMOL/L (ref 3.5–5.1)
PROT SERPL-MCNC: 7.3 G/DL (ref 6.4–8.2)
RBC # BLD AUTO: 4.21 MIL/UL (ref 4–5.2)
SODIUM SERPL-SCNC: 141 MMOL/L (ref 136–145)
WBC NRBC COR # BLD AUTO: 5.8 K/UL (ref 4.3–11)

## 2023-03-28 PROCEDURE — 93005 ELECTROCARDIOGRAM TRACING: CPT

## 2023-03-28 PROCEDURE — 75574 CT ANGIO HRT W/3D IMAGE: CPT

## 2023-03-28 PROCEDURE — 83690 ASSAY OF LIPASE: CPT

## 2023-03-28 PROCEDURE — 80048 BASIC METABOLIC PNL TOTAL CA: CPT

## 2023-03-28 PROCEDURE — 85025 COMPLETE CBC W/AUTO DIFF WBC: CPT

## 2023-03-28 PROCEDURE — 71045 X-RAY EXAM CHEST 1 VIEW: CPT

## 2023-03-28 PROCEDURE — 84484 ASSAY OF TROPONIN QUANT: CPT

## 2023-03-28 PROCEDURE — 99285 EMERGENCY DEPT VISIT HI MDM: CPT

## 2023-03-28 PROCEDURE — 96374 THER/PROPH/DIAG INJ IV PUSH: CPT

## 2023-03-28 PROCEDURE — 80076 HEPATIC FUNCTION PANEL: CPT

## 2023-03-28 PROCEDURE — 36415 COLL VENOUS BLD VENIPUNCTURE: CPT

## 2023-03-28 NOTE — NUR
CTCA PROCEDURE WELL TOLERATED BY THE PT. PT IS AAOX4, NOT IN RESPIRATORY 
DISTRESS, HOOKED TO V/S MONITOR, KEPT RESTED AND COMFORTABLE. REPORT GIVEN TO 
JUNE CHU FOR [place].

## 2023-03-28 NOTE — NUR
RECEIVED PT CAME FROM MD OFFICE FOR evaluation accampany by candi  on chest 
pain and epegastric pain for one week vomiting x1

## 2023-03-28 NOTE — NUR
IV removed. Catheter intact and site benign. Pressure and 4x4 applied to site. 
No bleeding noted.Patient discharged to home in stable condition. Written and 
verbal after care instructions given. Patient verbalizes understanding of 
instruction. The patient is picked up by her daughter.